# Patient Record
Sex: FEMALE | Race: WHITE | NOT HISPANIC OR LATINO
[De-identification: names, ages, dates, MRNs, and addresses within clinical notes are randomized per-mention and may not be internally consistent; named-entity substitution may affect disease eponyms.]

---

## 2024-04-22 PROBLEM — Z00.00 ENCOUNTER FOR PREVENTIVE HEALTH EXAMINATION: Status: ACTIVE | Noted: 2024-04-22

## 2024-04-29 ENCOUNTER — APPOINTMENT (OUTPATIENT)
Dept: ANTEPARTUM | Facility: CLINIC | Age: 34
End: 2024-04-29
Payer: COMMERCIAL

## 2024-04-29 ENCOUNTER — ASOB RESULT (OUTPATIENT)
Age: 34
End: 2024-04-29

## 2024-04-29 PROCEDURE — 76805 OB US >/= 14 WKS SNGL FETUS: CPT

## 2024-05-06 ENCOUNTER — APPOINTMENT (OUTPATIENT)
Dept: ANTEPARTUM | Facility: CLINIC | Age: 34
End: 2024-05-06
Payer: COMMERCIAL

## 2024-05-06 ENCOUNTER — ASOB RESULT (OUTPATIENT)
Age: 34
End: 2024-05-06

## 2024-05-06 PROCEDURE — 76816 OB US FOLLOW-UP PER FETUS: CPT

## 2024-09-05 ENCOUNTER — INPATIENT (INPATIENT)
Facility: HOSPITAL | Age: 34
LOS: 2 days | Discharge: HOME | DRG: 951 | End: 2024-09-08
Attending: OBSTETRICS & GYNECOLOGY | Admitting: OBSTETRICS & GYNECOLOGY
Payer: COMMERCIAL

## 2024-09-05 VITALS — DIASTOLIC BLOOD PRESSURE: 74 MMHG | HEART RATE: 84 BPM | SYSTOLIC BLOOD PRESSURE: 117 MMHG | OXYGEN SATURATION: 99 %

## 2024-09-05 DIAGNOSIS — Z98.890 OTHER SPECIFIED POSTPROCEDURAL STATES: Chronic | ICD-10-CM

## 2024-09-05 DIAGNOSIS — O26.899 OTHER SPECIFIED PREGNANCY RELATED CONDITIONS, UNSPECIFIED TRIMESTER: ICD-10-CM

## 2024-09-05 DIAGNOSIS — Z34.80 ENCOUNTER FOR SUPERVISION OF OTHER NORMAL PREGNANCY, UNSPECIFIED TRIMESTER: ICD-10-CM

## 2024-09-05 LAB
BASOPHILS # BLD AUTO: 0.01 K/UL — SIGNIFICANT CHANGE UP (ref 0–0.2)
BASOPHILS NFR BLD AUTO: 0.1 % — SIGNIFICANT CHANGE UP (ref 0–2)
EOSINOPHIL # BLD AUTO: 0.03 K/UL — SIGNIFICANT CHANGE UP (ref 0–0.5)
EOSINOPHIL NFR BLD AUTO: 0.4 % — SIGNIFICANT CHANGE UP (ref 0–6)
HCT VFR BLD CALC: 32.8 % — LOW (ref 34.5–45)
HGB BLD-MCNC: 10.5 G/DL — LOW (ref 11.5–15.5)
IMM GRANULOCYTES NFR BLD AUTO: 0.1 % — SIGNIFICANT CHANGE UP (ref 0–0.9)
LYMPHOCYTES # BLD AUTO: 1.36 K/UL — SIGNIFICANT CHANGE UP (ref 1–3.3)
LYMPHOCYTES # BLD AUTO: 19.8 % — SIGNIFICANT CHANGE UP (ref 13–44)
MCHC RBC-ENTMCNC: 27 PG — SIGNIFICANT CHANGE UP (ref 27–34)
MCHC RBC-ENTMCNC: 32 GM/DL — SIGNIFICANT CHANGE UP (ref 32–36)
MCV RBC AUTO: 84.3 FL — SIGNIFICANT CHANGE UP (ref 80–100)
MONOCYTES # BLD AUTO: 0.83 K/UL — SIGNIFICANT CHANGE UP (ref 0–0.9)
MONOCYTES NFR BLD AUTO: 12.1 % — SIGNIFICANT CHANGE UP (ref 2–14)
NEUTROPHILS # BLD AUTO: 4.64 K/UL — SIGNIFICANT CHANGE UP (ref 1.8–7.4)
NEUTROPHILS NFR BLD AUTO: 67.5 % — SIGNIFICANT CHANGE UP (ref 43–77)
NRBC # BLD: 0 /100 WBCS — SIGNIFICANT CHANGE UP (ref 0–0)
PLATELET # BLD AUTO: 218 K/UL — SIGNIFICANT CHANGE UP (ref 150–400)
RBC # BLD: 3.89 M/UL — SIGNIFICANT CHANGE UP (ref 3.8–5.2)
RBC # FLD: 13.7 % — SIGNIFICANT CHANGE UP (ref 10.3–14.5)
WBC # BLD: 6.88 K/UL — SIGNIFICANT CHANGE UP (ref 3.8–10.5)
WBC # FLD AUTO: 6.88 K/UL — SIGNIFICANT CHANGE UP (ref 3.8–10.5)

## 2024-09-05 RX ORDER — CHLORHEXIDINE GLUCONATE 40 MG/ML
1 SOLUTION TOPICAL DAILY
Refills: 0 | Status: DISCONTINUED | OUTPATIENT
Start: 2024-09-05 | End: 2024-09-06

## 2024-09-05 RX ORDER — SODIUM CITRATE AND CITRIC ACID MONOHYDRATE 334; 500 MG/5ML; MG/5ML
15 SOLUTION ORAL EVERY 6 HOURS
Refills: 0 | Status: DISCONTINUED | OUTPATIENT
Start: 2024-09-05 | End: 2024-09-06

## 2024-09-05 RX ORDER — OXYTOCIN 10 UNIT/ML
333.33 AMPUL (ML) INJECTION
Qty: 20 | Refills: 0 | Status: DISCONTINUED | OUTPATIENT
Start: 2024-09-05 | End: 2024-09-06

## 2024-09-05 NOTE — OB PROVIDER H&P - IN ACCORDANCE WITH [place] STATE LAW, WE OFFER EVERY PATIENT A HEPATITIS C TEST. WOULD YOU LIKE TO BE TESTED TODAY?
Previously negative [Midline] : trachea located in midline position [Normal] : cranial nerves 2-12 intact [de-identified] : large amount cerumen cleared au

## 2024-09-05 NOTE — OB RN TRIAGE NOTE - COMFORT/ACCEPTABLE PAIN LEVEL (0-10)
Dx: R low back pain with sciatica         Authorized # of Visits:  No limit         Next MD visit: none scheduled  Fall Risk: standard         Precautions: n/a             Subjective: Patient reports she is doing much better today.     Objective:   Pain in 5

## 2024-09-05 NOTE — OB PROVIDER H&P - ASSESSMENT
34yo F  @39+0 IOL for PROM @1230p    Plan  - Admit to L&D. Routine Labs. IVF.  - IOL w/ buccal cytotec an cervical balloon  - Fetus: cat 1 tracing. VTX. EFW 3458g by sono today. Continuous EFM. No concerns.  - Prenatal issues: none  - GBS neg  - Pain: epidural PRN    Patient discussed with attending physician, Dr. Murray.    BRINA Sibley, PGY4

## 2024-09-05 NOTE — OB PROVIDER H&P - NSHPPHYSICALEXAM_GEN_ALL_CORE
T(C): 36.8 (09-05-24 @ 23:14), Max: 36.8 (09-05-24 @ 21:55)  HR: 92 (09-05-24 @ 23:34) (78 - 107)  BP: 117/74 (09-05-24 @ 23:14) (117/74 - 117/74)  RR: 16 (09-05-24 @ 23:14) (16 - 16)  SpO2: 97% (09-05-24 @ 23:34) (94% - 99%)    Gen: NAD  CV: RRR  Pulm: breathing comfortably on RA  Abd: gravid, nontender  Extr: moving all extremities with ease  – Spec: pos pooling, pos nitrazine, pos ferning, neg bleeding  – VE: 1.5/50/-3  – FHT Cat I: baseline 130, mod variability, +accels, -decels  – [place]: q5 min  – Sono: vertex, MVP 8

## 2024-09-05 NOTE — PRE-ANESTHESIA EVALUATION ADULT - NSANTHOSAYNRD_GEN_A_CORE
No. KELTON screening performed.  STOP BANG Legend: 0-2 = LOW Risk; 3-4 = INTERMEDIATE Risk; 5-8 = HIGH Risk

## 2024-09-05 NOTE — OB PROVIDER H&P - NSLOWPPHRISK_OBGYN_A_OB
No previous uterine incision/[place] Pregnancy/Less than or equal to 4 previous vaginal births/No known bleeding disorder/No history of postpartum hemorrhage/No other PPH risks indicated

## 2024-09-05 NOTE — OB RN TRIAGE NOTE - FALL HARM RISK - UNIVERSAL INTERVENTIONS
Bed in lowest position, wheels locked, appropriate side rails in place/Call bell, personal items and telephone in reach/Instruct patient to call for assistance before getting out of bed or chair/Non-slip footwear when patient is out of bed/[place] to call system/Physically safe environment - no spills, clutter or unnecessary equipment/Purposeful Proactive Rounding/Room/bathroom lighting operational, light cord in reach

## 2024-09-05 NOTE — OB PROVIDER H&P - NS_PRENATALLABSOURCEHEPATITISC_OBGYN_ALL_OB
Pt with known bronchiectasis based on CT chest in 2017. Pt denies having an OP Pulmonologist.   currently asymptomatic   -ctm  -incentive spirometry hard copy, drawn during this pregnancy

## 2024-09-05 NOTE — OB PROVIDER H&P - HISTORY OF PRESENT ILLNESS
HPI: 34yo F  @39+0 presents for leaking of fluid since 12:30pm. Patient was seen in the office after experiencing a gush of fluid but nitrazine was negative. Patient continued to have leaking of blood tinged fluid throughout the day. She has also started to feel contractions although they are mild. +FM. -LOF. -CTXs. -VB. Pt denies any other concerns.    PNC: Denies prenatal issues.   GBS neg  EFW 3458g by timothy today.    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 Safe at home/in relationship.  Meds: PNV   Allergies: NKDA  Will accept blood transfusions? Yes

## 2024-09-06 ENCOUNTER — TRANSCRIPTION ENCOUNTER (OUTPATIENT)
Age: 34
End: 2024-09-06

## 2024-09-06 LAB
BLD GP AB SCN SERPL QL: NEGATIVE — SIGNIFICANT CHANGE UP
RH IG SCN BLD-IMP: POSITIVE — SIGNIFICANT CHANGE UP
T PALLIDUM AB TITR SER: NEGATIVE — SIGNIFICANT CHANGE UP

## 2024-09-06 RX ORDER — SODIUM CHLORIDE 9 MG/ML
3 INJECTION INTRAMUSCULAR; INTRAVENOUS; SUBCUTANEOUS EVERY 8 HOURS
Refills: 0 | Status: DISCONTINUED | OUTPATIENT
Start: 2024-09-06 | End: 2024-09-08

## 2024-09-06 RX ORDER — IBUPROFEN 600 MG
600 TABLET ORAL EVERY 6 HOURS
Refills: 0 | Status: DISCONTINUED | OUTPATIENT
Start: 2024-09-06 | End: 2024-09-08

## 2024-09-06 RX ORDER — TERBUTALINE SULFATE 1 MG/ML
0.25 VIAL (ML) SUBCUTANEOUS ONCE
Refills: 0 | Status: COMPLETED | OUTPATIENT
Start: 2024-09-06 | End: 2024-09-06

## 2024-09-06 RX ORDER — VITAMIN A, ASCORBIC ACID, VITAMIN D, .ALPHA.-TOCOPHEROL, THIAMINE MONONITRATE, RIBOFLAVIN, NIACIN, PYRIDOXINE HYDROCHLORIDE, FOLIC ACID, CYANOCOBALAMIN, CALCIUM, IRON, MAGNESIUM, ZINC, AND COPPER 2700; 70; 400; 30; 1.6; 1.8; 18; 2.5; 1; 12; 100; 65; 25; 25; 2 [IU]/1; MG/1; [IU]/1; [IU]/1; MG/1; MG/1; MG/1; MG/1; MG/1; UG/1; MG/1; MG/1; MG/1; MG/1; MG/1
1 TABLET ORAL DAILY
Refills: 0 | Status: DISCONTINUED | OUTPATIENT
Start: 2024-09-06 | End: 2024-09-08

## 2024-09-06 RX ORDER — OXYTOCIN 10 UNIT/ML
41.67 AMPUL (ML) INJECTION
Qty: 20 | Refills: 0 | Status: DISCONTINUED | OUTPATIENT
Start: 2024-09-06 | End: 2024-09-08

## 2024-09-06 RX ORDER — KETOROLAC TROMETHAMINE 30 MG/ML
30 INJECTION, SOLUTION INTRAMUSCULAR ONCE
Refills: 0 | Status: DISCONTINUED | OUTPATIENT
Start: 2024-09-06 | End: 2024-09-06

## 2024-09-06 RX ORDER — HYDROCORTISONE 1 %
1 OINTMENT (GRAM) TOPICAL EVERY 6 HOURS
Refills: 0 | Status: DISCONTINUED | OUTPATIENT
Start: 2024-09-06 | End: 2024-09-08

## 2024-09-06 RX ORDER — MENTHOL/CETYLPYRD CL 3 MG
1 LOZENGE MUCOUS MEMBRANE EVERY 6 HOURS
Refills: 0 | Status: DISCONTINUED | OUTPATIENT
Start: 2024-09-06 | End: 2024-09-08

## 2024-09-06 RX ORDER — LANOLIN
1 OINTMENT (GRAM) TOPICAL EVERY 6 HOURS
Refills: 0 | Status: DISCONTINUED | OUTPATIENT
Start: 2024-09-06 | End: 2024-09-08

## 2024-09-06 RX ORDER — PRAMOXINE HCL 1 %
1 GEL (GRAM) TOPICAL EVERY 4 HOURS
Refills: 0 | Status: DISCONTINUED | OUTPATIENT
Start: 2024-09-06 | End: 2024-09-08

## 2024-09-06 RX ORDER — WITCH HAZEL 1 G/ML
1 LIQUID TOPICAL EVERY 4 HOURS
Refills: 0 | Status: DISCONTINUED | OUTPATIENT
Start: 2024-09-06 | End: 2024-09-08

## 2024-09-06 RX ORDER — DIPHENHYDRAMINE HCL 50 MG
25 CAPSULE ORAL EVERY 6 HOURS
Refills: 0 | Status: DISCONTINUED | OUTPATIENT
Start: 2024-09-06 | End: 2024-09-08

## 2024-09-06 RX ORDER — IBUPROFEN 600 MG
600 TABLET ORAL EVERY 6 HOURS
Refills: 0 | Status: COMPLETED | OUTPATIENT
Start: 2024-09-06 | End: 2025-08-05

## 2024-09-06 RX ORDER — OXYCODONE HYDROCHLORIDE 5 MG/1
5 TABLET ORAL
Refills: 0 | Status: DISCONTINUED | OUTPATIENT
Start: 2024-09-06 | End: 2024-09-08

## 2024-09-06 RX ORDER — ACETAMINOPHEN 325 MG/1
975 TABLET ORAL
Refills: 0 | Status: DISCONTINUED | OUTPATIENT
Start: 2024-09-06 | End: 2024-09-08

## 2024-09-06 RX ORDER — TETANUS TOXOID, REDUCED DIPHTHERIA TOXOID AND ACELLULAR PERTUSSIS VACCINE, ADSORBED 5; 2.5; 8; 8; 2.5 [IU]/.5ML; [IU]/.5ML; UG/.5ML; UG/.5ML; UG/.5ML
0.5 SUSPENSION INTRAMUSCULAR ONCE
Refills: 0 | Status: DISCONTINUED | OUTPATIENT
Start: 2024-09-06 | End: 2024-09-08

## 2024-09-06 RX ORDER — OXYCODONE HYDROCHLORIDE 5 MG/1
5 TABLET ORAL ONCE
Refills: 0 | Status: DISCONTINUED | OUTPATIENT
Start: 2024-09-06 | End: 2024-09-08

## 2024-09-06 RX ADMIN — ACETAMINOPHEN 975 MILLIGRAM(S): 325 TABLET ORAL at 18:02

## 2024-09-06 RX ADMIN — Medication 600 MILLIGRAM(S): at 16:00

## 2024-09-06 RX ADMIN — ACETAMINOPHEN 975 MILLIGRAM(S): 325 TABLET ORAL at 18:32

## 2024-09-06 RX ADMIN — Medication 600 MILLIGRAM(S): at 21:00

## 2024-09-06 RX ADMIN — Medication 125 MILLILITER(S): at 00:41

## 2024-09-06 RX ADMIN — Medication 125 MILLILITER(S): at 01:00

## 2024-09-06 RX ADMIN — Medication 0.25 MILLIGRAM(S): at 03:25

## 2024-09-06 RX ADMIN — Medication 600 MILLIGRAM(S): at 20:30

## 2024-09-06 RX ADMIN — ACETAMINOPHEN 975 MILLIGRAM(S): 325 TABLET ORAL at 12:36

## 2024-09-06 RX ADMIN — Medication 600 MILLIGRAM(S): at 15:29

## 2024-09-06 RX ADMIN — ACETAMINOPHEN 975 MILLIGRAM(S): 325 TABLET ORAL at 13:06

## 2024-09-06 RX ADMIN — KETOROLAC TROMETHAMINE 30 MILLIGRAM(S): 30 INJECTION, SOLUTION INTRAMUSCULAR at 10:05

## 2024-09-06 NOTE — DISCHARGE NOTE OB - MATERIALS PROVIDED
[facility] Halliday Screening Program/Breastfeeding Log/Guide to Postpartum Care/Breastfeeding Guide and Packet/Discharge Medication Information for Patients and Families Pocket Guide

## 2024-09-06 NOTE — DISCHARGE NOTE OB - HOSPITAL COURSE
The patient was admitted with prom.   she had a cervical balloon and cytotec and progressed in labor.  she had a short second stage and normal delivery and second degree laceration.   she recovered well.   The patient was admitted with prom.   she had a cervical balloon and cytotec and progressed in labor.  she had a short second stage and normal delivery and second degree laceration.   she recovered well.  discharge home on PPD2

## 2024-09-06 NOTE — DISCHARGE NOTE OB - PLAN OF CARE
routine post partum care;  regular diet;  limited physical and sexual activities for 5-6 weeks; to office in 5-6 weeks;

## 2024-09-06 NOTE — OB PROVIDER LABOR PROGRESS NOTE - ASSESSMENT
P0 IOL for PROM, patient with intermittent category II tracing undergoing resuscitation  - Holding pitocin for now  - cEFM, [place]  - Fluid bolus running, repositioning  - Consider IUPC/AI    D/w Dr. Terri Kemp PGY3
32yo  @39+1 IOL for PROM@1230p    - SVE: /-3  - CB placed at 2a  - c/w another dose of buccal cytotec then switch to pitocin  - FHT Cat 1, reassuring, ctm  - epidural PRN    Plan per BRINA Sheridan, PGY4
33F  at 39.1wks gestational age admitted for an IOL for PROM @1230p ().    -Prolonged deceleration x 6 min with a dima of 60bpm  -Second dose of terbutaline given with improvement in tracing  -IV fluid bolus given  -Cervical balloon noted to be dislodged in vaginal vault, gush of blood-tinged fluid notable with during exam  -Cervical dilation noted per Dr. Moon, pt now 6cm dilated  -Discontinue buccal cytotec  -Continue monitoring EFM/toco    Discussed with Dr. Murray by Dr. Sibley, PGY-4  Phyllis Cisneros PA-C
patient tried pushing with 2 contractions.   pushes with good effort.    late decels after pushing.   will push every other   good pelvis.  
33F  at 39.1wks gestational age admitted for an IOL for PROM @1230p ().    -6 min prolonged deceleration likely secondary to tetanic contractions, terbutaline x1 dose given with adequate response to fetal heart rate  -No cervical changes noted  -Continue buccal cytotec and plan for cervical balloon  -Continue monitoring EFM/toco    Discussed with and plan per GILDARDO LuceroC

## 2024-09-06 NOTE — DISCHARGE NOTE OB - SWOLLEN, PAINFUL HOT AREAS AND/OR STREAKS ON THE BREAST
Left message re new patient sleep ref from Dr. Vivi Fu. Has pt had any prior sleep studies/evals? Check insurance and schedule w/NM.
Statement Selected

## 2024-09-06 NOTE — OB PROVIDER DELIVERY SUMMARY - NSSELHIDDEN_OBGYN_ALL_OB_FT
[NS_DeliveryAttending1_OBGYN_ALL_OB_FT:[id number]==],[NS_DeliveryAssist1_OBGYN_ALL_OB_FT:[id number]]

## 2024-09-06 NOTE — DISCHARGE NOTE OB - CARE PROVIDER_API CALL
Jeff Laurent  Obstetrics and Gynecology  [address], [address]  [place] [address]  Phone: [phone]  Fax: [phone]  Follow Up Time:

## 2024-09-06 NOTE — OB RN DELIVERY SUMMARY - NSSELHIDDEN_OBGYN_ALL_OB_FT
[NS_DeliveryAttending1_OBGYN_ALL_OB_FT:[id number]==],[NS_DeliveryAssist1_OBGYN_ALL_OB_FT:[id number]],[NS_DeliveryRN_OBGYN_ALL_OB_FT:[id number]=]

## 2024-09-06 NOTE — DISCHARGE NOTE OB - PATIENT PORTAL LINK FT
You can access the FollowMyHealth Patient Portal offered by [facility] by registering at the following website: [email or web address]. By joining [facility]’s FollowMyHealth portal, you will also be able to view your health information using other applications (apps) compatible with our system.

## 2024-09-06 NOTE — OB PROVIDER LABOR PROGRESS NOTE - NS_OBIHIFHRDETAILS_OBGYN_ALL_OB_FT
120/moderate variability/+accels/prolonged deceleration x6 min now resolved
mod variability, no accels, +intermittent late and variable decels
130/moderate variability/+accels/second isolated prolonged deceleration x 6 min, now resolved
mod variability
Cat 1: 130/mod variability/ + accels/ - decels

## 2024-09-06 NOTE — OB PROVIDER DELIVERY SUMMARY - NSPROVIDERDELIVERYNOTE_OBGYN_ALL_OB_FT
Patient was found to be fully dilated and directed to push with contractions.  Spontaneous vaginal delivery of liveborn female.  Head, shoulders and body delivered easily. Loose nuchal x1.  Infant was passed to mother.  Cord clamping was delayed 1 minute. Cord was cut.  Placenta delivered spontaneously intact. Uterine massage was performed and pitocin was given.  Vaginal walls, sulci, and cervix examined. Second degree laceration was repaired with 2-0 and 3-0 vicryl rapide in the usual fashion.   Fundus was firm.    Good hemostasis was noted.  Count correct x2.     Salima Arredondo, PGY1   Dr. Laurent, attending, was present for the entirety of the delivery and repair

## 2024-09-06 NOTE — OB RN DELIVERY SUMMARY - NS_SEPSISRSKCALC_OBGYN_ALL_OB_FT
EOS calculated successfully. EOS Risk Factor: 0.5/1000 live births ([facility] national incidence); GA=39w1d; Temp=98.24; ROM=19.733; GBS='Negative'; Antibiotics='No antibiotics or any antibiotics < 2 hrs prior to birth'

## 2024-09-06 NOTE — OB PROVIDER LABOR PROGRESS NOTE - NS_SUBJECTIVE/OBJECTIVE_OBGYN_ALL_OB_FT
Pt evaluated at bedside for a prolonged deceleration.     ICU Vital Signs Last 24 Hrs  T(C): 36.8 (06 Sep 2024 01:24), Max: 36.8 (05 Sep 2024 21:55)  T(F): 98.24 (06 Sep 2024 01:24), Max: 98.24 (05 Sep 2024 21:55)  HR: 98 (06 Sep 2024 03:35) (70 - 137)  BP: 124/66 (06 Sep 2024 03:25) (109/61 - 126/68)  RR: 16 (05 Sep 2024 23:14) (16 - 16)  SpO2: 100% (06 Sep 2024 03:35) (91% - 100%)    O2 Parameters below as of 05 Sep 2024 23:14  Patient On (Oxygen Delivery Method): room air
Patient seen for VE due to continued intermittent cat 2 tracing    VE 5.5/80/-3
feeling some pressure
Pt evaluated at bedside for a prolonged deceleration.     ICU Vital Signs Last 24 Hrs  T(C): 36.8 (05 Sep 2024 23:14), Max: 36.8 (05 Sep 2024 21:55)  T(F): 98.2 (05 Sep 2024 23:14), Max: 98.24 (05 Sep 2024 21:55)  HR: 92 (06 Sep 2024 00:24) (70 - 107)  BP: 117/74 (05 Sep 2024 23:14) (117/74 - 117/74)  RR: 16 (05 Sep 2024 23:14) (16 - 16)  SpO2: 97% (06 Sep 2024 00:24) (94% - 99%)    O2 Parameters below as of 05 Sep 2024 23:14  Patient On (Oxygen Delivery Method): room air
Cervical balloon explained to patient who accepts    T(C): 36.8 (09-06-24 @ 01:24), Max: 36.8 (09-05-24 @ 21:55)  HR: 81 (09-06-24 @ 02:00) (70 - 117)  BP: 119/73 (09-06-24 @ 01:24) (117/74 - 119/73)  RR: 16 (09-05-24 @ 23:14) (16 - 16)  SpO2: 98% (09-06-24 @ 02:00) (94% - 100%)

## 2024-09-06 NOTE — DISCHARGE NOTE OB - CARE PLAN
1 Principal Discharge DX:	Normal vaginal delivery  Assessment and plan of treatment:	routine post partum care;  regular diet;  limited physical and sexual activities for 5-6 weeks; to office in 5-6 weeks;

## 2024-09-07 RX ADMIN — ACETAMINOPHEN 975 MILLIGRAM(S): 325 TABLET ORAL at 15:55

## 2024-09-07 RX ADMIN — ACETAMINOPHEN 975 MILLIGRAM(S): 325 TABLET ORAL at 09:25

## 2024-09-07 RX ADMIN — Medication 600 MILLIGRAM(S): at 17:30

## 2024-09-07 RX ADMIN — Medication 600 MILLIGRAM(S): at 23:14

## 2024-09-07 RX ADMIN — VITAMIN A, ASCORBIC ACID, VITAMIN D, .ALPHA.-TOCOPHEROL, THIAMINE MONONITRATE, RIBOFLAVIN, NIACIN, PYRIDOXINE HYDROCHLORIDE, FOLIC ACID, CYANOCOBALAMIN, CALCIUM, IRON, MAGNESIUM, ZINC, AND COPPER 1 TABLET(S): 2700; 70; 400; 30; 1.6; 1.8; 18; 2.5; 1; 12; 100; 65; 25; 25; 2 TABLET ORAL at 12:12

## 2024-09-07 RX ADMIN — ACETAMINOPHEN 975 MILLIGRAM(S): 325 TABLET ORAL at 15:11

## 2024-09-07 RX ADMIN — Medication 600 MILLIGRAM(S): at 18:20

## 2024-09-07 RX ADMIN — ACETAMINOPHEN 975 MILLIGRAM(S): 325 TABLET ORAL at 20:35

## 2024-09-07 RX ADMIN — Medication 600 MILLIGRAM(S): at 12:58

## 2024-09-07 RX ADMIN — Medication 600 MILLIGRAM(S): at 23:53

## 2024-09-07 RX ADMIN — Medication 600 MILLIGRAM(S): at 06:06

## 2024-09-07 RX ADMIN — ACETAMINOPHEN 975 MILLIGRAM(S): 325 TABLET ORAL at 10:15

## 2024-09-07 RX ADMIN — ACETAMINOPHEN 975 MILLIGRAM(S): 325 TABLET ORAL at 20:05

## 2024-09-07 RX ADMIN — Medication 600 MILLIGRAM(S): at 12:12

## 2024-09-07 NOTE — PROGRESS NOTE ADULT - ASSESSMENT
A/P: 32yo F  from  in stable condition. Patient reports no complaints this AM. Currently beginning to meet postpartum milestones.     #Postpartum  - Continue with PO analgesia  - Increase ambulation  - Continue regular diet  - IV lock  - No labs    Mary Anne Bhatt PGY1

## 2024-09-08 VITALS
SYSTOLIC BLOOD PRESSURE: 103 MMHG | RESPIRATION RATE: 18 BRPM | OXYGEN SATURATION: 100 % | TEMPERATURE: 98 F | HEART RATE: 86 BPM | DIASTOLIC BLOOD PRESSURE: 73 MMHG

## 2024-09-08 PROCEDURE — 86780 TREPONEMA PALLIDUM: CPT

## 2024-09-08 PROCEDURE — 86900 BLOOD TYPING SEROLOGIC ABO: CPT

## 2024-09-08 PROCEDURE — 85025 COMPLETE CBC W/AUTO DIFF WBC: CPT

## 2024-09-08 PROCEDURE — 86850 RBC ANTIBODY SCREEN: CPT

## 2024-09-08 PROCEDURE — 86901 BLOOD TYPING SEROLOGIC RH(D): CPT

## 2024-09-08 PROCEDURE — 36415 COLL VENOUS BLD VENIPUNCTURE: CPT

## 2024-09-08 RX ORDER — VITAMIN A, ASCORBIC ACID, VITAMIN D, .ALPHA.-TOCOPHEROL, THIAMINE MONONITRATE, RIBOFLAVIN, NIACIN, PYRIDOXINE HYDROCHLORIDE, FOLIC ACID, CYANOCOBALAMIN, CALCIUM, IRON, MAGNESIUM, ZINC, AND COPPER 2700; 70; 400; 30; 1.6; 1.8; 18; 2.5; 1; 12; 100; 65; 25; 25; 2 [IU]/1; MG/1; [IU]/1; [IU]/1; MG/1; MG/1; MG/1; MG/1; MG/1; UG/1; MG/1; MG/1; MG/1; MG/1; MG/1
1 TABLET ORAL
Qty: 0 | Refills: 0 | DISCHARGE
Start: 2024-09-08

## 2024-09-08 RX ORDER — IBUPROFEN 600 MG
1 TABLET ORAL
Qty: 0 | Refills: 0 | DISCHARGE
Start: 2024-09-08

## 2024-09-08 RX ORDER — ACETAMINOPHEN 325 MG/1
3 TABLET ORAL
Qty: 0 | Refills: 0 | DISCHARGE
Start: 2024-09-08

## 2024-09-08 RX ADMIN — ACETAMINOPHEN 975 MILLIGRAM(S): 325 TABLET ORAL at 09:24

## 2024-09-08 RX ADMIN — Medication 600 MILLIGRAM(S): at 06:46

## 2024-09-08 RX ADMIN — VITAMIN A, ASCORBIC ACID, VITAMIN D, .ALPHA.-TOCOPHEROL, THIAMINE MONONITRATE, RIBOFLAVIN, NIACIN, PYRIDOXINE HYDROCHLORIDE, FOLIC ACID, CYANOCOBALAMIN, CALCIUM, IRON, MAGNESIUM, ZINC, AND COPPER 1 TABLET(S): 2700; 70; 400; 30; 1.6; 1.8; 18; 2.5; 1; 12; 100; 65; 25; 25; 2 TABLET ORAL at 12:44

## 2024-09-08 RX ADMIN — ACETAMINOPHEN 975 MILLIGRAM(S): 325 TABLET ORAL at 08:54

## 2024-09-08 RX ADMIN — Medication 600 MILLIGRAM(S): at 13:24

## 2024-09-08 RX ADMIN — Medication 600 MILLIGRAM(S): at 06:18

## 2024-09-08 RX ADMIN — Medication 600 MILLIGRAM(S): at 12:44

## 2024-09-08 NOTE — PROGRESS NOTE ADULT - SUBJECTIVE AND OBJECTIVE BOX
OB Attending Note    S: Patient doing well. Minimal lochia. Pain controlled.    O: Vital Signs Last 24 Hrs  T(C): 36.7 (08 Sep 2024 06:08), Max: 36.7 (07 Sep 2024 17:00)  T(F): 98.1 (08 Sep 2024 06:08), Max: 98.1 (08 Sep 2024 06:08)  HR: 86 (08 Sep 2024 06:08) (82 - 86)  BP: 103/73 (08 Sep 2024 06:08) (103/73 - 115/75)  BP(mean): --  RR: 18 (08 Sep 2024 06:08) (18 - 18)  SpO2: 100% (08 Sep 2024 06:08) (100% - 100%)    Parameters below as of 08 Sep 2024 06:08  Patient On (Oxygen Delivery Method): room air        Gen: NAD  Abd: soft, NT, ND, fundus firm below umbilicus  Lochia: min  Perineum healing well  Ext: no tenderness    Labs:      A: 33y PPD#2 s/p  doing well.    Plan: cont PP care  OOB/ambulation  Pain control  discharge home. discharge instructions given. FU in office in 4-6 weeks    Frieda Luz DO
OB Attending Note    S: Patient doing well. Minimal lochia. Pain controlled.    O: Vital Signs Last 24 Hrs  T(C): 36.7 (07 Sep 2024 06:11), Max: 36.9 (06 Sep 2024 17:21)  T(F): 98 (07 Sep 2024 06:11), Max: 98.4 (06 Sep 2024 17:21)  HR: 99 (07 Sep 2024 06:11) (80 - 99)  BP: 111/76 (07 Sep 2024 06:11) (98/65 - 111/76)  BP(mean): --  RR: 18 (07 Sep 2024 06:11) (18 - 18)  SpO2: 100% (07 Sep 2024 06:11) (97% - 100%)    Parameters below as of 07 Sep 2024 06:11  Patient On (Oxygen Delivery Method): room air        Gen: NAD  Abd: soft, NT, ND, fundus firm below umbilicus  Lochia: min  Perineum healing well  Ext: no tenderness    Labs:                        10.5   6.88  )-----------( 218      ( 05 Sep 2024 23:10 )             32.8       A: 33y PPD#1 s/p  doing well.    Plan: cont PP care  OOB/ambulation  Pain control    Frieda Luz DO 
R1 Progress Note PPD1    Patient seen and examined at bedside, no acute overnight events. No acute complaints, pain well controlled. Patient is ambulating, voiding spontaneously, passing gas, and tolerating regular diet. Denies CP, SOB, N/V, HA, or blurred vision.     Vital Signs Last 24 Hours  T(C): 36.7 (09-07-24 @ 06:11), Max: 36.9 (09-06-24 @ 17:21)  HR: 99 (09-07-24 @ 06:11) (80 - 99)  BP: 111/76 (09-07-24 @ 06:11) (98/65 - 111/76)  RR: 18 (09-07-24 @ 06:11) (18 - 18)  SpO2: 100% (09-07-24 @ 06:11) (97% - 100%)    Physical Exam:  General: NAD  Lung: breathing comfortably on room air   Abdomen: Soft, appropriately tender, mildly distended, fundus firm   Pelvic: Lochia wnl  Ext: no calf tenderness b/l    Labs:    Blood Type: B Positive  Antibody Screen: --  RPR: Negative               10.5   6.88  )-----------( 218      ( 09-05 @ 23:10 )             32.8       MEDICATIONS  (STANDING):  acetaminophen     Tablet .. 975 milliGRAM(s) Oral <User Schedule>  diphtheria/tetanus/pertussis (acellular) Vaccine (Adacel) 0.5 milliLiter(s) IntraMuscular once  ibuprofen  Tablet. 600 milliGRAM(s) Oral every 6 hours  oxytocin Infusion 41.667 milliUNIT(s)/Min (125 mL/Hr) IV Continuous <Continuous>  prenatal multivitamin 1 Tablet(s) Oral daily  sodium chloride 0.9% lock flush 3 milliLiter(s) IV Push every 8 hours    MEDICATIONS  (PRN):  benzocaine 20%/menthol 0.5% Spray 1 Spray(s) Topical every 6 hours PRN for Perineal discomfort  dibucaine 1% Ointment 1 Application(s) Topical every 6 hours PRN Perineal discomfort  diphenhydrAMINE 25 milliGRAM(s) Oral every 6 hours PRN Pruritus  hydrocortisone 1% Cream 1 Application(s) Topical every 6 hours PRN Moderate Pain (4-6)  lanolin Ointment 1 Application(s) Topical every 6 hours PRN nipple soreness  magnesium hydroxide Suspension 30 milliLiter(s) Oral two times a day PRN Constipation  oxyCODONE    IR 5 milliGRAM(s) Oral every 3 hours PRN Moderate to Severe Pain (4-10)  oxyCODONE    IR 5 milliGRAM(s) Oral once PRN Moderate to Severe Pain (4-10)  pramoxine 1%/zinc 5% Cream 1 Application(s) Topical every 4 hours PRN Moderate Pain (4-6)  simethicone 80 milliGRAM(s) Chew every 4 hours PRN Gas  witch hazel Pads 1 Application(s) Topical every 4 hours PRN Perineal discomfort

## 2025-08-27 ENCOUNTER — APPOINTMENT (OUTPATIENT)
Dept: ORTHOPEDIC SURGERY | Facility: CLINIC | Age: 35
End: 2025-08-27
Payer: COMMERCIAL

## 2025-08-27 VITALS — BODY MASS INDEX: 23.32 KG/M2 | WEIGHT: 140 LBS | HEIGHT: 65 IN

## 2025-08-27 DIAGNOSIS — M23.92 UNSPECIFIED INTERNAL DERANGEMENT OF LEFT KNEE: ICD-10-CM

## 2025-08-27 PROCEDURE — 73564 X-RAY EXAM KNEE 4 OR MORE: CPT | Mod: LT

## 2025-08-27 PROCEDURE — 99204 OFFICE O/P NEW MOD 45 MIN: CPT | Mod: 25

## 2025-08-27 RX ORDER — NAPROXEN 500 MG/1
500 TABLET ORAL TWICE DAILY
Qty: 30 | Refills: 0 | Status: ACTIVE | COMMUNITY
Start: 2025-08-27 | End: 1900-01-01

## 2025-08-29 ENCOUNTER — APPOINTMENT (OUTPATIENT)
Dept: MRI IMAGING | Facility: CLINIC | Age: 35
End: 2025-08-29
Payer: COMMERCIAL

## 2025-08-29 ENCOUNTER — TRANSCRIPTION ENCOUNTER (OUTPATIENT)
Age: 35
End: 2025-08-29

## 2025-08-29 PROCEDURE — 73721 MRI JNT OF LWR EXTRE W/O DYE: CPT | Mod: LT

## 2025-09-10 ENCOUNTER — APPOINTMENT (OUTPATIENT)
Dept: ORTHOPEDIC SURGERY | Facility: CLINIC | Age: 35
End: 2025-09-10